# Patient Record
Sex: FEMALE | Race: WHITE
[De-identification: names, ages, dates, MRNs, and addresses within clinical notes are randomized per-mention and may not be internally consistent; named-entity substitution may affect disease eponyms.]

---

## 2018-05-19 ENCOUNTER — HOSPITAL ENCOUNTER (EMERGENCY)
Dept: HOSPITAL 25 - UCCORT | Age: 58
Discharge: HOME | End: 2018-05-19
Payer: MEDICARE

## 2018-05-19 VITALS — SYSTOLIC BLOOD PRESSURE: 144 MMHG | DIASTOLIC BLOOD PRESSURE: 72 MMHG

## 2018-05-19 DIAGNOSIS — Z88.5: ICD-10-CM

## 2018-05-19 DIAGNOSIS — Z88.8: ICD-10-CM

## 2018-05-19 DIAGNOSIS — N30.01: Primary | ICD-10-CM

## 2018-05-19 PROCEDURE — 99211 OFF/OP EST MAY X REQ PHY/QHP: CPT

## 2018-05-19 PROCEDURE — 87086 URINE CULTURE/COLONY COUNT: CPT

## 2018-05-19 PROCEDURE — 81003 URINALYSIS AUTO W/O SCOPE: CPT

## 2018-05-19 PROCEDURE — G0463 HOSPITAL OUTPT CLINIC VISIT: HCPCS

## 2018-05-19 NOTE — UC
Complaint Female HPI





- HPI Summary


HPI Summary: 


Onset urinary frequency and urgency for a week. Gross hematuria today.





- History Of Current Complaint


Stated Complaint: URINARY


Hx Obtained From: Patient


Hx Last Menstrual Period: ABOUT ONE YEAR AGO, GOING THROUGH MENOPAUSE


Pregnant?: No


Onset/Duration: Sudden Onset, Lasting Weeks, Worse Since


Timing: Constant


Severity Initially: Mild


Severity Currently: Moderate


Pain Intensity: 4


Character: Dull


Aggravating Factor(s): Urination


Associated Signs And Symptoms: Positive: Back Pain.  Negative: Fever, Nausea





- Risk Factors


Ectopic Pregnancy Risk Factor: Negative





- Allergies/Home Medications


Allergies/Adverse Reactions: 


 Allergies











Allergy/AdvReac Type Severity Reaction Status Date / Time


 


baclofen Allergy Severe RESPIRATORY Verified 05/19/18 14:44





   DEPRESSION  


 


hydrocodone Allergy Severe RESPIRATORY Verified 05/19/18 14:44





   DEPRESSION  











Home Medications: 


 Home Medications





Baclofen TAB* [Lioresal TAB*] 10 mg PO BID 05/19/18 [History Confirmed 05/19/18]


Meclizine TAB* [Antivert 12.5 TAB*] 12.5 mg PO TID PRN 05/19/18 [History 

Confirmed 05/19/18]











PMH/Surg Hx/FS Hx/Imm Hx





- Additional Past Medical History


Additional PMH: 


Primary Lateral Sclerosis





- Surgical History


Surgical History: Yes


Surgery Procedure, Year, and Place: Biopsy left breast [facility].  LEFT KNEE SUGERY





- Social History


Occupation: Disabled


Lives: With Family


Alcohol Use: Daily


Alcohol Amount: WINE ONE GLASSS AT TIGHT


Substance Use Type: None


Substance Use Comment - Amount & Last Used: CBD OIL


Smoking Status (MU): Never Smoked Tobacco


Have You Smoked in the Last Year: No





Review of Systems


Genitourinary: Hematuria, Frequency, Urgency


Is Patient Immunocompromised?: No


All Other Systems Reviewed And Are Negative: Yes





Physical Exam


Triage Information Reviewed: Yes


Appearance: Well-Appearing, No Pain Distress, Thin - and weak


Vital Signs: 


 Initial Vital Signs











Temp  98.4 F   05/19/18 14:45


 


Pulse  102   05/19/18 14:45


 


Resp  20   05/19/18 14:45


 


BP  144/72   05/19/18 14:45


 


Pulse Ox  99   05/19/18 14:45











Vital Signs Reviewed: Yes


Eyes: Positive: Conjunctiva Clear


Neck exam: Normal


Respiratory Exam: Normal


Cardiovascular Exam: Normal


Abdomen Description: Positive: Nontender, No Organomegaly, Soft


Bowel Sounds: Positive: Present


Musculoskeletal: Positive: Strength Limited @ - . In wheelchair


Neurological: Positive: Abnormal Muscle Tone


Psychological Exam: Normal


Skin Exam: Normal





 Complaint Female Dx





- Differential Dx/Diagnosis


Differential Diagnosis/HQI/PQRI: Renal Colic, Ureteral Stone, Urinary Tract 

Infection


Provider Diagnoses: Acute hemorrhagic cystitis





Discharge





- Sign-Out/Discharge


Documenting (check all that apply): Discharge/Admit/Transfer





- Discharge Plan


Condition: Stable


Disposition: HOME


Prescriptions: 


Cefdinir 250mg/5 ml* [Omnicef 250 mg/5 ml*] 300 mg PO BID 7 Days #100 btl


Patient Education Materials:  Urinary Tract Infection in Women (ED), Cefdinir (

By mouth), Hematuria (ED)


Referrals: 


Jose R Nicholson MD [Primary Care Provider] - 





- Billing Disposition and Condition


Condition: STABLE


Disposition: HOME

## 2018-08-25 ENCOUNTER — HOSPITAL ENCOUNTER (EMERGENCY)
Dept: HOSPITAL 25 - UCCORT | Age: 58
Discharge: HOME | End: 2018-08-25
Payer: MEDICARE

## 2018-08-25 VITALS — SYSTOLIC BLOOD PRESSURE: 124 MMHG | DIASTOLIC BLOOD PRESSURE: 88 MMHG

## 2018-08-25 DIAGNOSIS — R07.89: ICD-10-CM
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
## 2018-08-25 DIAGNOSIS — Z88.8: ICD-10-CM
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
## 2018-08-25 DIAGNOSIS — S92.354A: Primary | ICD-10-CM
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
## 2018-08-25 PROCEDURE — 99212 OFFICE O/P EST SF 10 MIN: CPT

## 2018-08-25 PROCEDURE — G0463 HOSPITAL OUTPT CLINIC VISIT: HCPCS
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
## 2018-08-25 NOTE — RAD
Indication: Pain, swelling, bruising lateral RIGHT foot following injury 10 days ago. Pain

and swelling along the third through fifth metatarsals.



Comparison: No relevant prior exams available on the [facility] PACS for comparison.



Technique: AP, lateral, and oblique views RIGHT foot. 



Report: Bone density appears decreased throughout. Nondisplaced avulsion fracture at the

tuberosity of the base of the fifth metatarsal with intra-articular extension demonstrates

osseous remodeling consistent with early healing response. Periosteal reaction at the

level of the proximal metaphysis and diaphysis of the fifth metatarsal reference the AP

view suspicious for a healing fracture at the proximal metaphysis diaphysis junction.

Normal variant os tibiale externum accessory ossicle. Normal articular alignment. Mild

osteoarthritis at the first metatarsal phalangeal joint. Nonfocal soft tissue edema. 



IMPRESSION: 

#. Subacute appearing nondisplaced avulsion fracture at the tuberosity of the base of the

fifth metatarsal with early healing response. 

#. Periosteal reaction at the level of the proximal metaphysis and diaphysis of the fifth

metatarsal reference the AP view suspicious for a healing fracture at the proximal

metaphysis diaphysis junction. 

#. Decreased bone density.